# Patient Record
(demographics unavailable — no encounter records)

---

## 2025-07-17 NOTE — REVIEW OF SYSTEMS
[Negative] : Heme/Lymph [Joint Pain] : no joint pain [Joint Stiffness] : no joint stiffness [Muscle Pain] : no muscle pain [Muscle Weakness] : no muscle weakness [Back Pain] : back pain [Joint Swelling] : no joint swelling

## 2025-07-17 NOTE — HEALTH RISK ASSESSMENT
[Good] : ~his/her~  mood as  good [0] : 2) Feeling down, depressed, or hopeless: Not at all (0) [PHQ-2 Negative - No further assessment needed] : PHQ-2 Negative - No further assessment needed [Yes] : Reviewed medication list for presence of high-risk medications. [Opioids] : opioids [Former] : Former [5-9] : 5-9 [de-identified] : socially [de-identified] : Smoke Marijuana socially [[id number]] : 0 [de-identified] : Smoked  e cigarettess for 10 years , stopped in 2 months ago Admission

## 2025-07-17 NOTE — HEALTH RISK ASSESSMENT
[Good] : ~his/her~  mood as  good [0] : 2) Feeling down, depressed, or hopeless: Not at all (0) [PHQ-2 Negative - No further assessment needed] : PHQ-2 Negative - No further assessment needed [Yes] : Reviewed medication list for presence of high-risk medications. [Opioids] : opioids [Former] : Former [5-9] : 5-9 [de-identified] : socially [de-identified] : Smoke Marijuana socially [[id number]] : 0 [de-identified] : Smoked  e cigarettess for 10 years , stopped in 2 months ago

## 2025-07-17 NOTE — HISTORY OF PRESENT ILLNESS
[de-identified] : 32 years old male came to office to establish his care  Pt complained right lower back pain after driving for more than 2 hours. No pain now.  Pt brought job form to be filled.

## 2025-07-17 NOTE — HISTORY OF PRESENT ILLNESS
[de-identified] : 32 years old male came to office to establish his care  Pt complained right lower back pain after driving for more than 2 hours. No pain now.  Pt brought job form to be filled.